# Patient Record
Sex: MALE
[De-identification: names, ages, dates, MRNs, and addresses within clinical notes are randomized per-mention and may not be internally consistent; named-entity substitution may affect disease eponyms.]

---

## 2018-08-21 ENCOUNTER — HOSPITAL ENCOUNTER (EMERGENCY)
Dept: HOSPITAL 25 - UCEAST | Age: 28
Discharge: HOME | End: 2018-08-21
Payer: COMMERCIAL

## 2018-08-21 VITALS — SYSTOLIC BLOOD PRESSURE: 116 MMHG | DIASTOLIC BLOOD PRESSURE: 67 MMHG

## 2018-08-21 DIAGNOSIS — R50.9: ICD-10-CM

## 2018-08-21 DIAGNOSIS — R51: Primary | ICD-10-CM

## 2018-08-21 DIAGNOSIS — F17.210: ICD-10-CM

## 2018-08-21 LAB
BASOPHILS # BLD AUTO: 0.1 10^3/UL (ref 0–0.2)
BASOPHILS # BLD: 0.1 10^3/UL (ref 0–0.2)
EOSINOPHIL # BLD AUTO: 0.2 10^3/UL (ref 0–0.6)
HCT VFR BLD AUTO: 40 % (ref 42–52)
HGB BLD-MCNC: 14 G/DL (ref 14–18)
LYMPHOCYTES # BLD AUTO: 4.7 10^3/UL (ref 1–4.8)
MCH RBC QN AUTO: 30 PG (ref 27–31)
MCHC RBC AUTO-ENTMCNC: 36 G/DL (ref 31–36)
MCV RBC AUTO: 86 FL (ref 80–94)
MONOCYTES # BLD AUTO: 0.9 10^3/UL (ref 0–0.8)
NEUTROPHILS # BLD AUTO: 2.5 10^3/UL (ref 1.5–7.7)
NEUTROPHILS # BLD: 1.7 10^3/UL (ref 1.5–7.7)
PLATELET # BLD AUTO: 145 10^3/UL (ref 150–450)
RBC # BLD AUTO: 4.62 10^6/UL (ref 4–5.4)
WBC # BLD AUTO: 8.2 10^3/UL (ref 3.5–10.8)

## 2018-08-21 PROCEDURE — G0463 HOSPITAL OUTPT CLINIC VISIT: HCPCS

## 2018-08-21 PROCEDURE — 36415 COLL VENOUS BLD VENIPUNCTURE: CPT

## 2018-08-21 PROCEDURE — 85060 BLOOD SMEAR INTERPRETATION: CPT

## 2018-08-21 PROCEDURE — 86308 HETEROPHILE ANTIBODY SCREEN: CPT

## 2018-08-21 PROCEDURE — 86140 C-REACTIVE PROTEIN: CPT

## 2018-08-21 PROCEDURE — 99201: CPT

## 2018-08-21 PROCEDURE — 86618 LYME DISEASE ANTIBODY: CPT

## 2018-08-21 PROCEDURE — 80053 COMPREHEN METABOLIC PANEL: CPT

## 2018-08-21 PROCEDURE — 85025 COMPLETE CBC W/AUTO DIFF WBC: CPT

## 2018-08-21 PROCEDURE — 86617 LYME DISEASE ANTIBODY: CPT

## 2018-08-21 NOTE — ED
Headache





- HPI Summary


HPI Summary: 





The pt is a 29 y/o male presenting to  c/o a temporal HA for the last 4 days 

worse during the night. The pain is rated 4/10 in severity. He took a Chinese 

antibiotic to no relief. He notes fever, mild neck pain, diaphoresis, rash on 

LUE and bilateral LE (2 days ago), ear itchiness, chest congestion, nausea, and 

loss of appetite. He denies back pain, sore throat, cough, abd pain, diarrhea, 

dysuria, ear pain. 





- History Of Current Complaint


Chief Complaint: UCHeadache


Stated Complaint: HEADACHE


Time Seen by Provider: 08/21/18 15:19


Hx Obtained From: Patient, Family/Caretaker - Wife


Onset/Duration: Started days ago - 4 days ago, Still Present


Currently Pain Is: Current Pain Scale(0-10)= - 4/10, Mild


Timing: Constant


Location of Headache: Parietal


Allevating Factors: Nothing


Associated Signs And Symptoms: Negative - back pain, sore throat, cough, abd 

pain, diarrhea, dysuria, ear pain., Nausea, Fever, Neck Pain - Mild, Other (

Noted In Comments) - diaphoresis, rash on LUE and bilateral LE (2 days ago), 

ear itchiness, chest congestion, and loss of appetite





- Allergies/Home Medications


Allergies/Adverse Reactions: 


 Allergies











Allergy/AdvReac Type Severity Reaction Status Date / Time


 


No Known Allergies Allergy   Verified 08/21/18 15:03











Home Medications: 


 Home Medications





Chinease Abx 1 08/21/18 [History]











PMH/Surg Hx/FS Hx/Imm Hx


Previously Healthy: Yes - Denies a hx of HTN, MI, DM and CA


Infectious Disease History: No


Infectious Disease History: 


   Denies: Traveled Outside the [place] in Last 30 Days





- Family History


Known Family History: Positive: Cardiac Disease, Hypertension





- Social History


Occupation: Employed Full-time


Lives: With Family


Alcohol Use: None


Substance Use Type: Reports: None


Smoking Status (MU): Light Every Day Tobacco Smoker


Type: Cigarettes





Review of Systems


Positive: Fever, Skin Diaphoresis


Positive: Other - Positive:  mild naeck pain, ear itchiness.  Negative: Sore 

Throat, Ear Ache


Positive: Other - Positive: Chest congestion, 


Positive: Nausea, Other - Postive: loss of appetite .  Negative: Abdominal Pain

, Diarrhea


Negative: dysuria


Musculoskeletal: Negative - Back pain


All Other Systems Reviewed And Are Negative: Yes





Physical Exam





- Summary


Physical Exam Summary: 





General: well-appearing, no pain distress


Skin: warm, color reflects adequate perfusion, dry


Head: normal; the patient is able to bring his chin to his chest and is able to 

extend his head and neck without any pain. 


Eyes: EOMI, KENNETH


ENT: normal


Neck: supple, nontender


Respiratory: CTA, breath sounds present


Cardiovascular: RRR


Abdomen: soft, nontender


Bowel: present


Musculoskeletal: normal, strength/ROM intact


Neurological: sensory/motor intact, A&O x3


Psychological: affect/mood appropriate


Triage Information Reviewed: Yes


Vital Signs On Initial Exam: 


 Initial Vitals











Temp Pulse Resp BP Pulse Ox


 


 99.9 F   101   18   116/67   98 


 


 08/21/18 14:57  08/21/18 14:57  08/21/18 14:57  08/21/18 14:57  08/21/18 14:57











Vital Signs Reviewed: Yes





Diagnostics





- Vital Signs


 Vital Signs











  Temp Pulse Resp BP Pulse Ox


 


 08/21/18 14:57  99.9 F  101  18  116/67  98














- Laboratory


Lab Statement: Any lab studies that have been ordered have been reviewed, and 

results considered in the medical decision making process.





Headache Course/Dx





- Course


Course Of Treatment: NO CLINICAL SIGNS OF MENINGITIS AT THIS TIME.  LABS 

PENDING.  RX DOXY. DISCUSSED IF SX WORSEN; HEADACHE WORSE, NECK STIFFNESS, 

FEELING ILL, THESE COULD BE A SIGN OF MENINGITIS AND TO BE EVALUATED IN THE 

EMERGENCY DEPARTMENT.  F/U PMD; RECHECK SOONER WITH ANY CONCERNS.





- Diagnoses


Provider Diagnoses: 


 Headache, Fever








Discharge





- Sign-Out/Discharge


Documenting (check all that apply): Patient Departure


All imaging exams completed and their final reports reviewed: No Studies





- Discharge Plan


Condition: Stable


Disposition: HOME


Prescriptions: 


DOXYcycline CAP(*) [DOXYcycline 100MG CAP(*)] 100 mg PO BID #28 cap


Patient Education Materials:  Fever in Adults (ED), Acute Headache (ED)


Referrals: 


[facility] [Outside]


Additional Instructions: 


FOLLOW UP WITH YOUR DOCTOR. 


GO TO THE EMERGENCY DEPARTMENT FOR ANY WORSENING OF YOUR CONDITION; HEADACHE, 

FEVER, STIFF NECK, YOU FEEL ILL OR QUESTIONS OR CONCERNS.





- Billing Disposition and Condition


Condition: STABLE


Disposition: Home





- Attestation Statements


Document Initiated by Scribe: Yes


Documenting Scribe: Dorina Scott 


Provider For Whom Scribe is Documenting (Include Credential): Dr. Brandon Spears MD


Scribe Attestation: 


IDorina , scribed for Dr. Brandon Spears MD on 08/21/18 at [facility]. 


Scribe Documentation Reviewed: Yes


Provider Attestation: 


The documentation as recorded by the [email or web address]  accurately 

reflects the service I personally performed and the decisions made by me, Dr. Brandon Spears MD